# Patient Record
Sex: MALE | Race: WHITE
[De-identification: names, ages, dates, MRNs, and addresses within clinical notes are randomized per-mention and may not be internally consistent; named-entity substitution may affect disease eponyms.]

---

## 2017-07-11 ENCOUNTER — HOSPITAL ENCOUNTER (EMERGENCY)
Dept: HOSPITAL 76 - ED | Age: 77
Discharge: HOME | End: 2017-07-11
Payer: MEDICARE

## 2017-07-11 VITALS — DIASTOLIC BLOOD PRESSURE: 69 MMHG | SYSTOLIC BLOOD PRESSURE: 135 MMHG

## 2017-07-11 DIAGNOSIS — J45.909: ICD-10-CM

## 2017-07-11 DIAGNOSIS — M19.90: ICD-10-CM

## 2017-07-11 DIAGNOSIS — Z86.718: ICD-10-CM

## 2017-07-11 DIAGNOSIS — M79.605: Primary | ICD-10-CM

## 2017-07-11 PROCEDURE — 99283 EMERGENCY DEPT VISIT LOW MDM: CPT

## 2017-07-11 NOTE — ED PHYSICIAN DOCUMENTATION
History of Present Illness





- Stated complaint


Stated Complaint: LT LE PAIN





- Chief complaint


Chief Complaint: Ext Problem





- History obtained from


History obtained from: Patient





- History of Present Illness


Timing: Enter  time (17:30), Today


Pain level max: 8 (initially)


Pain level now: 0 (at rest)


Radiates to: no radiation


Improved by: rest


Worsened by: weight-bearing





- Additonal information


Additional information: 





sudden onset left leg pain proximal to knee, onset while standing in kitchen 

but without specific inciting event. Pain has significantly improved PTA, and 

he is pain-free at rest, but when he tries to weight-bear LLE, he feels the leg 

is unstable and the pain returns





Review of Systems


Cardiac: denies: Chest pain / pressure, Pedal edema


Respiratory: denies: Dyspnea


Musculoskeletal: reports: Extremity pain, Pain with weight bearing.  denies: 

Back pain, Joint pain, Extremity swelling, Joint swelling


Neurologic: denies: Focal weakness, Numbness





PD PAST MEDICAL HISTORY





- Past Medical History


Cardiovascular: Deep vein thrombosis, Other


Respiratory: Asthma, Other


Neuro: None


Endocrine/Autoimmune: None


GI: None


: Other


HEENT: None


Psych: Claustrophobia


Musculoskeletal: Osteoarthritis


Derm: None





- Past Surgical History


Past Surgical History: Yes


General: Appendectomy, Colonoscopy


Ortho: Hip replacement


HEENT: Tonsil/Adenoidectomy





- Present Medications


Home Medications: 


 Ambulatory Orders











 Medication  Instructions  Recorded  Confirmed


 


Fluticasone 44 Mcg [Flovent] 2 puffs INH BID 09/27/13 01/20/16


 


Ipratropium/Albuterol Inhaler 1 puffs INH QID PRN 09/27/13 01/20/16





[Combivent Inhaler]   


 


Montelukast Sodium [Singulair] 10 mg PO DAILY 09/27/13 01/20/16














- Allergies


Allergies/Adverse Reactions: 


 Allergies











Allergy/AdvReac Type Severity Reaction Status Date / Time


 


No Known Drug Allergies Allergy   Verified 07/11/17 19:27














- Social History


Does the pt smoke?: No


Smoking Status: Never smoker


Does the pt drink ETOH?: Yes


Does the pt have substance abuse?: No





- Immunizations


Immunizations are current?: Yes





- POLST


Patient has POLST: No





PD ED PE NORMAL





- Vitals


Vital signs reviewed: Yes





- General


General: Alert and oriented X 3, No acute distress, Well developed/nourished





- Derm


Derm: Normal color, Warm and dry, No rash





- Extremities


Extremities: No tenderness to palpate, Normal ROM s pain, No edema, No calf 

tenderness / cord, Other (2+ left DP and PT pulses. Brisk capillary refill left 

toes with LTS intact, normal tactile temperature of the left foot. Left leg 

including calf, thigh, and knee, are without tenderness, erythema, or swelling. 

FROM left knee without discomfort or difficulty. )





- Neuro


Neuro: No motor deficit (5/5 strength left dorsi/plantar flexion and left knee 

extension/ flexion), No sensory deficit





Results





- Vitals


Vitals: 


 Vital Signs - 24 hr











  07/11/17





  19:23


 


Temperature 36.8 C


 


Heart Rate 65


 


Respiratory 14





Rate 


 


Blood Pressure 135/69 H


 


O2 Saturation 98








 Oxygen











O2 Source []                   Room air


 


O2 Source                      Room air

















- Rads (name of study)


  ** LLE venous doppler US


Radiology: Prelim report reviewed, See rad report





PD MEDICAL DECISION MAKING





- ED course


Complexity details: reviewed old records, reviewed results, re-evaluated patient

, considered differential, d/w patient


ED course: 





declined pain medication, both in ED and as rx





Departure





- Departure


Disposition: 01 Home, Self Care


Clinical Impression: 


 Pain of lower extremity


Condition: Good


Instructions:  ED Acute Pain [facility]


Follow-Up: 


ROLA CHARLTON MD [Primary Care Provider] - Within 3 Days


Discharge Date/Time: 07/11/17 22:10

## 2017-07-11 NOTE — ULTRASOUND PRELIMINARY REPORT
Accession: [id number]

Exam: US Duplex Ext Veins Left

 

IMPRESSION: No evidence for deep venous thrombosis.

 

[facility]

 

SITE ID: [id number]

## 2017-07-11 NOTE — ULTRASOUND REPORT
EXAM:

LEFT LOWER EXTREMITY VENOUS ULTRASOUND 

 

EXAM DATE: 7/11/2017 09:32 PM.

 

CLINICAL HISTORY: Left lower extremity pain, history of DVT (right lower extremity).

 

COMPARISON: None.

 

TECHNIQUE: Real-time sonographic vascular imaging was performed by the sonographer through the lower 
extremity utilizing both color-flow and Doppler spectral analysis. Multiple representative static brandon
ges were saved for review.

 

FINDINGS: 

Common Femoral Vein (CFV): Normal.

CFV-GSV Junction: Normal.

Profunda Femoral Vein (PFV): Normal.

Femoral Vein (FV) Prox: Normal.

Femoral Vein (FV) Mid: Normal.

Femoral Vein (FV) Dist: Normal.

Popliteal Vein: Normal.

Posterior Tibial Veins: Normal.

Peroneal Veins: Normal.

Contralateral Side CFV: Normal.

 

Other: None.

 

IMPRESSION: No evidence for deep venous thrombosis.

 

[facility]

Referring Provider Line: [phone]

 

SITE ID: [id number]

## 2017-11-24 ENCOUNTER — HOSPITAL ENCOUNTER (OUTPATIENT)
Dept: HOSPITAL 76 - LAB.WCP | Age: 77
Discharge: HOME | End: 2017-11-24
Attending: FAMILY MEDICINE
Payer: MEDICARE

## 2017-11-24 DIAGNOSIS — Z79.899: ICD-10-CM

## 2017-11-24 DIAGNOSIS — Z00.00: Primary | ICD-10-CM

## 2017-11-24 DIAGNOSIS — Z12.5: ICD-10-CM

## 2017-11-24 DIAGNOSIS — Z13.220: ICD-10-CM

## 2017-11-24 LAB
ALBUMIN/GLOB SERPL: 1.5 {RATIO} (ref 1–2.2)
ANION GAP SERPL CALCULATED.4IONS-SCNC: 6 MMOL/L (ref 6–13)
BASOPHILS NFR BLD AUTO: 0 10^3/UL (ref 0–0.1)
BASOPHILS NFR BLD AUTO: 0.7 %
BILIRUB BLD-MCNC: 0.8 MG/DL (ref 0.2–1)
BUN SERPL-MCNC: 26 MG/DL (ref 6–20)
CALCIUM UR-MCNC: 9.8 MG/DL (ref 8.5–10.3)
CHLORIDE SERPL-SCNC: 107 MMOL/L (ref 101–111)
CHOLEST SERPL-MCNC: 186 MG/DL
CO2 SERPL-SCNC: 25 MMOL/L (ref 21–32)
CREAT SERPLBLD-SCNC: 0.9 MG/DL (ref 0.6–1.2)
EOSINOPHIL # BLD AUTO: 0.1 10^3/UL (ref 0–0.7)
EOSINOPHIL NFR BLD AUTO: 2.1 %
ERYTHROCYTE [DISTWIDTH] IN BLOOD BY AUTOMATED COUNT: 14.2 % (ref 12–15)
GFRSERPLBLD MDRD-ARVRAT: 82 ML/MIN/{1.73_M2} (ref 89–?)
GLOBULIN SER-MCNC: 2.8 G/DL (ref 2.1–4.2)
GLUCOSE SERPL-MCNC: 101 MG/DL (ref 70–100)
HCT VFR BLD AUTO: 46.2 % (ref 42–52)
HDLC SERPL-MCNC: 53 MG/DL
HDLC SERPL: 3.5 {RATIO} (ref ?–5)
HGB UR QL STRIP: 15.4 G/DL (ref 14–18)
LDLC/HDLC SERPL: 2.2 {RATIO} (ref ?–3.6)
LYMPHOCYTES # SPEC AUTO: 2 10^3/UL (ref 1.5–3.5)
LYMPHOCYTES NFR BLD AUTO: 30.4 %
MCH RBC QN AUTO: 30.5 PG (ref 27–31)
MCHC RBC AUTO-ENTMCNC: 33.4 G/DL (ref 32–36)
MCV RBC AUTO: 91.3 FL (ref 80–94)
MONOCYTES # BLD AUTO: 0.6 10^3/UL (ref 0–1)
MONOCYTES NFR BLD AUTO: 8.7 %
NEUTROPHILS # BLD AUTO: 3.9 10^3/UL (ref 1.5–6.6)
NEUTROPHILS # SNV AUTO: 6.7 X10^3/UL (ref 4.8–10.8)
NEUTROPHILS NFR BLD AUTO: 58.1 %
NRBC # BLD AUTO: 0.1 /100WBC
PDW BLD AUTO: 9.4 FL (ref 7.4–11.4)
POTASSIUM SERPL-SCNC: 4.3 MMOL/L (ref 3.5–5)
PROT SPEC-MCNC: 7 G/DL (ref 6.7–8.2)
RBC MAR: 5.06 10^6/UL (ref 4.7–6.1)
SODIUM SERPLBLD-SCNC: 138 MMOL/L (ref 135–145)
TRIGL P FAST SERPL-MCNC: 90 MG/DL
VLDLC SERPL-SCNC: 18 MG/DL
WBC # BLD: 6.7 X10^3/UL

## 2017-11-24 PROCEDURE — 85025 COMPLETE CBC W/AUTO DIFF WBC: CPT

## 2017-11-24 PROCEDURE — 80061 LIPID PANEL: CPT

## 2017-11-24 PROCEDURE — 84153 ASSAY OF PSA TOTAL: CPT

## 2017-11-24 PROCEDURE — 36415 COLL VENOUS BLD VENIPUNCTURE: CPT

## 2017-11-24 PROCEDURE — 80053 COMPREHEN METABOLIC PANEL: CPT

## 2017-12-30 ENCOUNTER — HOSPITAL ENCOUNTER (EMERGENCY)
Dept: HOSPITAL 76 - ED | Age: 77
Discharge: HOME | End: 2017-12-30
Payer: MEDICARE

## 2017-12-30 VITALS — DIASTOLIC BLOOD PRESSURE: 73 MMHG | SYSTOLIC BLOOD PRESSURE: 147 MMHG

## 2017-12-30 DIAGNOSIS — Z86.718: ICD-10-CM

## 2017-12-30 DIAGNOSIS — R10.84: Primary | ICD-10-CM

## 2017-12-30 LAB
ALBUMIN DIAFP-MCNC: 4.4 G/DL (ref 3.2–5.5)
ALBUMIN/GLOB SERPL: 1.4 {RATIO} (ref 1–2.2)
ALP SERPL-CCNC: 66 IU/L (ref 42–121)
ALT SERPL W P-5'-P-CCNC: 27 IU/L (ref 10–60)
ANION GAP SERPL CALCULATED.4IONS-SCNC: 6 MMOL/L (ref 6–13)
AST SERPL W P-5'-P-CCNC: 28 IU/L (ref 10–42)
BASOPHILS NFR BLD AUTO: 0.1 10^3/UL (ref 0–0.1)
BASOPHILS NFR BLD AUTO: 0.7 %
BILIRUB BLD-MCNC: 0.7 MG/DL (ref 0.2–1)
BUN SERPL-MCNC: 28 MG/DL (ref 6–20)
CALCIUM UR-MCNC: 9.8 MG/DL (ref 8.5–10.3)
CHLORIDE SERPL-SCNC: 105 MMOL/L (ref 101–111)
CLARITY UR REFRACT.AUTO: CLEAR
CO2 SERPL-SCNC: 29 MMOL/L (ref 21–32)
CREAT SERPLBLD-SCNC: 1 MG/DL (ref 0.6–1.2)
EOSINOPHIL # BLD AUTO: 0 10^3/UL (ref 0–0.7)
EOSINOPHIL NFR BLD AUTO: 0.6 %
ERYTHROCYTE [DISTWIDTH] IN BLOOD BY AUTOMATED COUNT: 14.2 % (ref 12–15)
GFRSERPLBLD MDRD-ARVRAT: 72 ML/MIN/{1.73_M2} (ref 89–?)
GLOBULIN SER-MCNC: 3.2 G/DL (ref 2.1–4.2)
GLUCOSE SERPL-MCNC: 116 MG/DL (ref 70–100)
GLUCOSE UR QL STRIP.AUTO: NEGATIVE MG/DL
HGB UR QL STRIP: 17 G/DL (ref 14–18)
KETONES UR QL STRIP.AUTO: NEGATIVE MG/DL
LIPASE SERPL-CCNC: 20 U/L (ref 22–51)
LYMPHOCYTES # SPEC AUTO: 1.9 10^3/UL (ref 1.5–3.5)
LYMPHOCYTES NFR BLD AUTO: 22.6 %
MCH RBC QN AUTO: 30.7 PG (ref 27–31)
MCHC RBC AUTO-ENTMCNC: 34.2 G/DL (ref 32–36)
MCV RBC AUTO: 89.7 FL (ref 80–94)
MONOCYTES # BLD AUTO: 1 10^3/UL (ref 0–1)
MONOCYTES NFR BLD AUTO: 12.5 %
NEUTROPHILS # BLD AUTO: 5.3 10^3/UL (ref 1.5–6.6)
NEUTROPHILS # SNV AUTO: 8.3 X10^3/UL (ref 4.8–10.8)
NEUTROPHILS NFR BLD AUTO: 63.6 %
NITRITE UR QL STRIP.AUTO: NEGATIVE
PDW BLD AUTO: 8.5 FL (ref 7.4–11.4)
PH UR STRIP.AUTO: 6 PH (ref 5–7.5)
PLATELET # BLD: 188 10^3/UL (ref 130–450)
PROT SPEC-MCNC: 7.6 G/DL (ref 6.7–8.2)
PROT UR STRIP.AUTO-MCNC: NEGATIVE MG/DL
RBC # UR STRIP.AUTO: NEGATIVE /UL
RBC MAR: 5.52 10^6/UL (ref 4.7–6.1)
SODIUM SERPLBLD-SCNC: 140 MMOL/L (ref 135–145)
SP GR UR STRIP.AUTO: >=1.03 (ref 1–1.03)
UROBILINOGEN UR QL STRIP.AUTO: (no result) E.U./DL
UROBILINOGEN UR STRIP.AUTO-MCNC: NEGATIVE MG/DL

## 2017-12-30 PROCEDURE — 84484 ASSAY OF TROPONIN QUANT: CPT

## 2017-12-30 PROCEDURE — 99284 EMERGENCY DEPT VISIT MOD MDM: CPT

## 2017-12-30 PROCEDURE — 83690 ASSAY OF LIPASE: CPT

## 2017-12-30 PROCEDURE — 81001 URINALYSIS AUTO W/SCOPE: CPT

## 2017-12-30 PROCEDURE — 36415 COLL VENOUS BLD VENIPUNCTURE: CPT

## 2017-12-30 PROCEDURE — 81003 URINALYSIS AUTO W/O SCOPE: CPT

## 2017-12-30 PROCEDURE — 80053 COMPREHEN METABOLIC PANEL: CPT

## 2017-12-30 PROCEDURE — 85025 COMPLETE CBC W/AUTO DIFF WBC: CPT

## 2017-12-30 PROCEDURE — 99283 EMERGENCY DEPT VISIT LOW MDM: CPT

## 2017-12-30 PROCEDURE — 87086 URINE CULTURE/COLONY COUNT: CPT

## 2017-12-30 PROCEDURE — 74176 CT ABD & PELVIS W/O CONTRAST: CPT

## 2017-12-30 NOTE — CT REPORT
EXAM:

CT ABDOMEN AND PELVIS

 

EXAM DATE: 12/30/2017 03:42 PM.

 

CLINICAL HISTORY: Diffuse abdomen pain. Vomiting.

 

COMPARISONS: None.

 

TECHNIQUE: Routine helical CT imaging was performed through the abdomen and pelvis. IV contrast: None
. Enteric contrast: No. Reconstructions: Coronal and sagittal.

 

In accordance with CT protocol optimization, one or more of the following dose reduction techniques w
ere utilized for this exam: automated exposure control, adjustment of mA and/or KV based on patient s
ize, or use of iterative reconstructive technique.

 

FINDINGS: 

Lung Bases: Aortic and coronary artery calcification noted, otherwise unremarkable..

 

Liver: Normal. No masses.

 

Gallbladder/Bile Ducts: Unremarkable.

 

Spleen: Normal.

 

Pancreas: Normal.

 

Adrenal Glands: Normal.

 

Kidneys: Lower 3.8 cm cyst and nonobstructing 0.7 cm calcification on the left, otherwise unremarkabl
e unenhanced kidneys and ureters.

 

Peritoneal Cavity/Bowel: Mild diverticulosis. No free fluid, free air or adenopathy. No masses or acu
te inflammatory process. Nonvisualized appendix.

 

Pelvic Organs: Detail obscured by metal artifact. Prosthetic calcifications noted. Visualized bladder
 appears unremarkable.

 

Vasculature: No aneurysms or other significant abnormality.

 

Bones: Anterior bridging of the sacroiliac joints noted. Bilateral hip arthroplasties noted.

 

Other: None.

 

IMPRESSION: 

1. Mild diverticulosis without diverticulitis or other acute bowel abnormality. 

2. Left renal cyst and nonobstructing intrarenal calculus, otherwise unremarkable unenhanced kidneys 
and ureters.

 

[facility]

Referring Provider Line: [phone]

 

SITE ID: [id number]

## 2017-12-30 NOTE — ED PHYSICIAN DOCUMENTATION
History of Present Illness





- Stated complaint


Stated Complaint: STOMACH PAINS





- Chief complaint


Chief Complaint: Abd Pain





- Additonal information


Additional information: 





hx from pt


77 male


s/p appy and hernia repairs X many


2-3 days of diffuse abd pain


severe yesterday better but not gone this AM now severe again


no fever


+ NV


no diarrhea


no bloody BM


last BM this AM


always has urinary hesitancy after many hernia surgeries


no back or chest pain








Review of Systems


Constitutional: denies: Fever, Chills


Cardiac: denies: Chest pain / pressure


Respiratory: denies: Dyspnea


GI: reports: Abdominal Pain, Nausea, Vomiting.  denies: Diarrhea


: reports: Hesitancy


Musculoskeletal: denies: Back pain


Endocrine: denies: Easy bruising / bleeding


Immunocompromised: denies: Immunocompromised





PD PAST MEDICAL HISTORY





- Past Medical History


Cardiovascular: Deep vein thrombosis, Other


Respiratory: Asthma, Other


Neuro: None


Endocrine/Autoimmune: None


GI: None


: Other


HEENT: None


Psych: Claustrophobia


Musculoskeletal: Osteoarthritis


Derm: None





- Past Surgical History


Past Surgical History: Yes


General: Appendectomy, Colonoscopy


Ortho: Hip replacement


HEENT: Tonsil/Adenoidectomy





- Present Medications


Home Medications: 


 Ambulatory Orders











 Medication  Instructions  Recorded  Confirmed


 


Fluticasone 44 Mcg [Flovent] 2 puffs INH BID 09/27/13 01/20/16


 


Ipratropium/Albuterol Inhaler 1 puffs INH QID PRN 09/27/13 01/20/16





[Combivent Inhaler]   


 


Montelukast Sodium [Singulair] 10 mg PO DAILY 09/27/13 01/20/16














- Allergies


Allergies/Adverse Reactions: 


 Allergies











Allergy/AdvReac Type Severity Reaction Status Date / Time


 


No Known Drug Allergies Allergy   Verified 07/11/17 19:27














- Social History


Does the pt smoke?: No


Smoking Status: Never smoker


Does the pt drink ETOH?: Yes


Does the pt have substance abuse?: No





- Immunizations


Immunizations are current?: Yes





- POLST


Patient has POLST: No





PD ED PE NORMAL





- Vitals


Vital signs reviewed: Yes





- Neck


Neck: Supple, no meningeal sign





- Cardiac


Cardiac: RRR





- Respiratory


Respiratory: No respiratory distress, Clear bilaterally





- Abdomen


Abdomen: Other (+ BS, protuberant but baseline per pt, reducible ventral hernia

, diffuse TTP  but not peritoneal)





- Back


Back: No CVA TTP





- Derm


Derm: Normal color





- Neuro


Neuro: Alert and oriented X 3





Results





- Vitals


Vitals: 


 Vital Signs - 24 hr











  12/30/17 12/30/17 12/30/17





  12:47 14:01 15:23


 


Temperature 36.1 C L 36.5 C 


 


Heart Rate 69 68 66


 


Respiratory 18 15 15





Rate   


 


Blood Pressure 135/75 H 132/66 H 153/76 H


 


O2 Saturation 96 96 99














  12/30/17





  17:35


 


Temperature 36.4 C L


 


Heart Rate 63


 


Respiratory 15





Rate 


 


Blood Pressure 119/70


 


O2 Saturation 97








 Oxygen











O2 Source [With Activity]      Room air


 


O2 Source                      Room air

















- Labs


Labs: 


 Laboratory Tests











  12/30/17 12/30/17 12/30/17





  13:58 13:58 13:58


 


WBC  8.3  


 


RBC  5.52  


 


Hgb  17.0  


 


Hct  49.5  


 


MCV  89.7  


 


MCH  30.7  


 


MCHC  34.2  


 


RDW  14.2  


 


Plt Count  188  


 


MPV  8.5  


 


Neut #  5.3  


 


Lymph #  1.9  


 


Mono #  1.0  


 


Eos #  0.0  


 


Baso #  0.1  


 


Absolute Nucleated RBC  0.01  


 


Nucleated RBC %  0.1  


 


Sodium   140 


 


Potassium   3.8 


 


Chloride   105 


 


Carbon Dioxide   29 


 


Anion Gap   6.0 


 


BUN   28 H 


 


Creatinine   1.0 


 


Estimated GFR (MDRD)   72 L 


 


Glucose   116 H 


 


Calcium   9.8 


 


Total Bilirubin   0.7 


 


AST   28 


 


ALT   27 


 


Alkaline Phosphatase   66 


 


Troponin I    < 0.04


 


Total Protein   7.6 


 


Albumin   4.4 


 


Globulin   3.2 


 


Albumin/Globulin Ratio   1.4 


 


Lipase   20 L 


 


Urine Color   


 


Urine Clarity   


 


Urine pH   


 


Ur Specific Gravity   


 


Urine Protein   


 


Urine Glucose (UA)   


 


Urine Ketones   


 


Urine Occult Blood   


 


Urine Nitrite   


 


Urine Bilirubin   


 


Urine Urobilinogen   


 


Ur Leukocyte Esterase   


 


Ur Microscopic Review   


 


Urine Culture Comments   














  12/30/17





  15:45


 


WBC 


 


RBC 


 


Hgb 


 


Hct 


 


MCV 


 


MCH 


 


MCHC 


 


RDW 


 


Plt Count 


 


MPV 


 


Neut # 


 


Lymph # 


 


Mono # 


 


Eos # 


 


Baso # 


 


Absolute Nucleated RBC 


 


Nucleated RBC % 


 


Sodium 


 


Potassium 


 


Chloride 


 


Carbon Dioxide 


 


Anion Gap 


 


BUN 


 


Creatinine 


 


Estimated GFR (MDRD) 


 


Glucose 


 


Calcium 


 


Total Bilirubin 


 


AST 


 


ALT 


 


Alkaline Phosphatase 


 


Troponin I 


 


Total Protein 


 


Albumin 


 


Globulin 


 


Albumin/Globulin Ratio 


 


Lipase 


 


Urine Color  YELLOW


 


Urine Clarity  CLEAR


 


Urine pH  6.0


 


Ur Specific Gravity  >=1.030 H


 


Urine Protein  NEGATIVE


 


Urine Glucose (UA)  NEGATIVE


 


Urine Ketones  NEGATIVE


 


Urine Occult Blood  NEGATIVE


 


Urine Nitrite  NEGATIVE


 


Urine Bilirubin  NEGATIVE


 


Urine Urobilinogen  0.2 (NORMAL)


 


Ur Leukocyte Esterase  NEGATIVE


 


Ur Microscopic Review  NOT INDICATED


 


Urine Culture Comments  NOT INDICATED














- Rads (name of study)


  ** CT AP


Radiology: See rad report (mild diverticulosis no -itis, no perf, no SBO, no AAA

, L renal cyst, non obstructing renal calculi, nl ureters)





PD MEDICAL DECISION MAKING





- ED course


ED course: 





waxing and waning fairly diffuse and generalized in a 77 male with a reducible 

ventral hernia but otherwise non peritineal exam, nl labs and a neg CT (no perf 

SBO infection AAA urteral stones etc


no hx afib, hx DVT, but pain is mild to mod (rates it a 3-5 per NN) so not pain 

out of proportion and CT in not an angio but does not suggest mesenteric 

ischemia 


so unfortunately do not know what is causing the pain 


in a 78 y/o this is concerning


but given the reassuring work up i think it is reasonable to let him go home 

with close fup


unfortunately this is a holiday weekend so close fup is difficult to obtain


will have pt come back to see me if still having sx tomorrow





Departure





- Departure


Disposition: 01 Home, Self Care


Clinical Impression: 


Abdominal pain


Qualifiers:


 Abdominal location: generalized Qualified Code(s): R10.84 - Generalized 

abdominal pain





Condition: Good


Instructions:  ED Abdominal Pain Unkn Cause


Comments: 


The good news is that all your tests came back fine.


Your blood work including kidney liver and pancreas function was fine.


The urine did not show any infection.


The CT scan did not show an aneurysm, a bowel infection/blockage/perforation, 

internal bleeding, and although there were some stones in the kidney none were 

actively passing to the bladder to explain the pain





The bad news is that I am still not sure what is causing the pain


But given the extensive and reassuring workup, I do not think you need surgery 

or admission or antibiotics at this time


I think it is safe for you to go home for tonight


It is very possible that over time, new or changing symptoms may develop that 

lead to a diagnosis not presently apparent.


That is why close follow up in this type of situation is very important.


Unfortunately this is a holiday weekend so close follow up with your PMD is not 

possible.


So I would like you to please come back and see me tomorrow morning - unless 

you are feeling much better in which case please call me to let me know [phone]


I have prescribed some medication to ease stomach pain - I do not want to 

prescribe anything very strong because I do not want to mask worsening symptoms

## 2017-12-31 ENCOUNTER — HOSPITAL ENCOUNTER (OUTPATIENT)
Dept: HOSPITAL 76 - ED | Age: 77
Setting detail: OBSERVATION
LOS: 1 days | Discharge: HOME | End: 2018-01-01
Attending: SURGERY | Admitting: SURGERY
Payer: MEDICARE

## 2017-12-31 DIAGNOSIS — K80.20: ICD-10-CM

## 2017-12-31 DIAGNOSIS — K56.609: ICD-10-CM

## 2017-12-31 DIAGNOSIS — K57.30: ICD-10-CM

## 2017-12-31 DIAGNOSIS — J45.909: ICD-10-CM

## 2017-12-31 DIAGNOSIS — Z86.718: ICD-10-CM

## 2017-12-31 DIAGNOSIS — K76.0: ICD-10-CM

## 2017-12-31 DIAGNOSIS — R14.0: ICD-10-CM

## 2017-12-31 DIAGNOSIS — I71.4: ICD-10-CM

## 2017-12-31 DIAGNOSIS — R10.10: Primary | ICD-10-CM

## 2017-12-31 DIAGNOSIS — K59.00: ICD-10-CM

## 2017-12-31 LAB
ALBUMIN DIAFP-MCNC: 4.3 G/DL (ref 3.2–5.5)
ALBUMIN/GLOB SERPL: 1.3 {RATIO} (ref 1–2.2)
ALP SERPL-CCNC: 64 IU/L (ref 42–121)
ALT SERPL W P-5'-P-CCNC: 27 IU/L (ref 10–60)
ANION GAP SERPL CALCULATED.4IONS-SCNC: 8 MMOL/L (ref 6–13)
AST SERPL W P-5'-P-CCNC: 30 IU/L (ref 10–42)
BASOPHILS NFR BLD AUTO: 0 10^3/UL (ref 0–0.1)
BASOPHILS NFR BLD AUTO: 0.5 %
BILIRUB BLD-MCNC: 0.8 MG/DL (ref 0.2–1)
BUN SERPL-MCNC: 29 MG/DL (ref 6–20)
CALCIUM UR-MCNC: 9.6 MG/DL (ref 8.5–10.3)
CHLORIDE SERPL-SCNC: 101 MMOL/L (ref 101–111)
CO2 SERPL-SCNC: 27 MMOL/L (ref 21–32)
CREAT SERPLBLD-SCNC: 0.9 MG/DL (ref 0.6–1.2)
EOSINOPHIL # BLD AUTO: 0.1 10^3/UL (ref 0–0.7)
EOSINOPHIL NFR BLD AUTO: 1 %
ERYTHROCYTE [DISTWIDTH] IN BLOOD BY AUTOMATED COUNT: 13.9 % (ref 12–15)
GFRSERPLBLD MDRD-ARVRAT: 82 ML/MIN/{1.73_M2} (ref 89–?)
GLOBULIN SER-MCNC: 3.4 G/DL (ref 2.1–4.2)
GLUCOSE SERPL-MCNC: 107 MG/DL (ref 70–100)
HGB UR QL STRIP: 16.2 G/DL (ref 14–18)
LIPASE SERPL-CCNC: 17 U/L (ref 22–51)
LYMPHOCYTES # SPEC AUTO: 2 10^3/UL (ref 1.5–3.5)
LYMPHOCYTES NFR BLD AUTO: 24.3 %
MCH RBC QN AUTO: 30.2 PG (ref 27–31)
MCHC RBC AUTO-ENTMCNC: 33.5 G/DL (ref 32–36)
MCV RBC AUTO: 90.1 FL (ref 80–94)
MONOCYTES # BLD AUTO: 0.7 10^3/UL (ref 0–1)
MONOCYTES NFR BLD AUTO: 8.3 %
NEUTROPHILS # BLD AUTO: 5.5 10^3/UL (ref 1.5–6.6)
NEUTROPHILS # SNV AUTO: 8.3 X10^3/UL (ref 4.8–10.8)
NEUTROPHILS NFR BLD AUTO: 65.9 %
PDW BLD AUTO: 8.8 FL (ref 7.4–11.4)
PLATELET # BLD: 195 10^3/UL (ref 130–450)
PROT SPEC-MCNC: 7.7 G/DL (ref 6.7–8.2)
RBC MAR: 5.35 10^6/UL (ref 4.7–6.1)
SODIUM SERPLBLD-SCNC: 136 MMOL/L (ref 135–145)

## 2017-12-31 PROCEDURE — 96361 HYDRATE IV INFUSION ADD-ON: CPT

## 2017-12-31 PROCEDURE — 80053 COMPREHEN METABOLIC PANEL: CPT

## 2017-12-31 PROCEDURE — 74174 CTA ABD&PLVS W/CONTRAST: CPT

## 2017-12-31 PROCEDURE — 85025 COMPLETE CBC W/AUTO DIFF WBC: CPT

## 2017-12-31 PROCEDURE — 99283 EMERGENCY DEPT VISIT LOW MDM: CPT

## 2017-12-31 PROCEDURE — 74022 RADEX COMPL AQT ABD SERIES: CPT

## 2017-12-31 PROCEDURE — 96372 THER/PROPH/DIAG INJ SC/IM: CPT

## 2017-12-31 PROCEDURE — 96374 THER/PROPH/DIAG INJ IV PUSH: CPT

## 2017-12-31 PROCEDURE — 99284 EMERGENCY DEPT VISIT MOD MDM: CPT

## 2017-12-31 PROCEDURE — 76705 ECHO EXAM OF ABDOMEN: CPT

## 2017-12-31 PROCEDURE — 36415 COLL VENOUS BLD VENIPUNCTURE: CPT

## 2017-12-31 PROCEDURE — 96375 TX/PRO/DX INJ NEW DRUG ADDON: CPT

## 2017-12-31 PROCEDURE — 83690 ASSAY OF LIPASE: CPT

## 2017-12-31 RX ADMIN — SODIUM CHLORIDE, PRESERVATIVE FREE SCH: 5 INJECTION INTRAVENOUS at 22:56

## 2017-12-31 RX ADMIN — SODIUM CHLORIDE SCH MLS/HR: 9 INJECTION, SOLUTION INTRAVENOUS at 20:43

## 2017-12-31 RX ADMIN — HEPARIN SODIUM SCH UNIT: 5000 INJECTION, SOLUTION INTRAVENOUS; SUBCUTANEOUS at 21:15

## 2017-12-31 NOTE — ULTRASOUND PRELIMINARY REPORT
Accession: [id number]

Exam: US ABDOMEN LIMITED

 

IMPRESSION: 

1. Cholelithiasis. No inflammation to suggest acute cholecystitis. 

2. Heterogeneous, probably fatty liver. No mass or intervertebral duct dilation. 

3. Normal common bile duct and pancreas.

 

Comment: Study limited due to body habitus.

 

[facility]

 

SITE ID: [id number]

## 2017-12-31 NOTE — ULTRASOUND REPORT
EXAM:

ABDOMEN ULTRASOUND LIMITED, RUQ

 

EXAM DATE: 12/31/2017 05:02 PM.

 

CLINICAL HISTORY: Abdominal pain after eating.

 

COMPARISON: 12/30/2017 CT abdomen and pelvis.

 

TECHNIQUE: Real-time scanning was performed with static images obtained. 

 

FINDINGS: 

Liver: Not well evaluated due to body habitus. No mass or intrahepatic bile duct dilation. Heterogene
ous liver. No mass or intrahepatic bile duct dilation. 15.7 cm. Main portal vein flow: Hepatopetal.

 

Gallbladder: 1.4 cm stone in the gallbladder neck. Mild gallbladder wall thickening. No pericholecyst
ic fluid. Per report, patient and a negative sonographic Kilpatrick's sign. 

 

Biliary System: CBD measures 7 mm. No intrahepatic or extrahepatic ductal dilatation. 

 

Pancreas: Echogenic. No mass, pancreatic ductal dilation or atrophy or abnormal calcifications.

 

Right kidney: 10.8 cm. No hydronephrosis. Incidental 1.3 x 1 cm simple cortical cyst.

 

Other: Study limited due to body habitus. 

 

IMPRESSION: 

1. Cholelithiasis. No inflammation to suggest acute cholecystitis. 

2. Heterogeneous, probably fatty liver. No mass or intrahepatic bile duct dilation. 

3. Normal common bile duct and pancreas.

 

Comment: Study limited due to body habitus.

 

[facility]

Referring Provider Line: [phone]

 

SITE ID: [id number]

## 2017-12-31 NOTE — ED PHYSICIAN DOCUMENTATION
History of Present Illness





- Stated complaint


Stated Complaint: ABD PX





- Chief complaint


Chief Complaint: Abd Pain





- Additonal information


Additional information: 





hx from pt


77 male seen by yesterday for generalized abd pain


pshx hernia repair X many and appy


exam notable for diffuse TTP s peritoneal sx and a reducible ventral hernia


labs and non con CT neg and pt with only mild to mod pain


dc with precautions and asked to return if worse


was better this AM, filled his bentyl, doing well until he ate (oatmeal and 

banana) and then pain returned - more severe than yesterday and localized to 

upper abd


no fever chills


no NVD


no bloody BM


no urinary sx





Review of Systems


Constitutional: denies: Fever, Chills


Cardiac: denies: Chest pain / pressure


Respiratory: denies: Dyspnea


GI: reports: Abdominal Pain.  denies: Nausea, Vomiting, Diarrhea


: denies: Dysuria


Musculoskeletal: denies: Back pain


Endocrine: denies: Easy bruising / bleeding


Immunocompromised: denies: Immunocompromised





PD PAST MEDICAL HISTORY





- Past Medical History


Cardiovascular: Deep vein thrombosis, Other


Respiratory: Asthma, Other


Neuro: None


Endocrine/Autoimmune: None


GI: None


: Other


HEENT: None


Psych: Claustrophobia


Musculoskeletal: Osteoarthritis


Derm: None





- Past Surgical History


Past Surgical History: Yes


General: Appendectomy, Colonoscopy


Ortho: Hip replacement


HEENT: Tonsil/Adenoidectomy





- Present Medications


Home Medications: 


 Ambulatory Orders











 Medication  Instructions  Recorded  Confirmed


 


Fluticasone 44 Mcg [Flovent] 2 puffs INH BID 09/27/13 12/31/17


 


Ipratropium/Albuterol Inhaler 1 puffs INH QID PRN 09/27/13 12/31/17





[Combivent Inhaler]   


 


Montelukast Sodium [Singulair] 10 mg PO DAILY 09/27/13 12/31/17


 


Dicyclomine [Bentyl] 10 mg PO Q8H PRN #20 capsule 12/30/17 12/31/17














- Allergies


Allergies/Adverse Reactions: 


 Allergies











Allergy/AdvReac Type Severity Reaction Status Date / Time


 


No Known Drug Allergies Allergy   Verified 12/31/17 15:39














- Social History


Does the pt smoke?: No


Smoking Status: Never smoker


Does the pt drink ETOH?: Yes


Does the pt have substance abuse?: No





- Immunizations


Immunizations are current?: Yes





- POLST


Patient has POLST: No





PD ED PE NORMAL





- Vitals


Vital signs reviewed: Yes





- General


General: Alert and oriented X 3





- HEENT


HEENT: PERRL





- Neck


Neck: Supple, no meningeal sign





- Cardiac


Cardiac: RRR





- Respiratory


Respiratory: No respiratory distress, Clear bilaterally





- Abdomen


Abdomen: Soft, Other (protuberant, + BS, soft, sig TTP upper abd and today more 

focal ruq pain with + murphys, no lower abd TTP)





- Derm


Derm: Normal color





- Extremities


Extremities: No deformity





- Neuro


Neuro: Alert and oriented X 3





Results





- Vitals


Vitals: 


 Vital Signs - 24 hr











  12/31/17 12/31/17





  15:36 16:01


 


Temperature 36.0 C L 36.5 C


 


Heart Rate 71 71


 


Respiratory 16 16





Rate  


 


Blood Pressure 179/65 H 143/88 H


 


O2 Saturation 97 97








 Oxygen











O2 Source []                   Room air


 


O2 Source                      Room air

















- Labs


Labs: 


 Laboratory Tests











  12/31/17 12/31/17





  16:20 16:20


 


WBC  8.3 


 


RBC  5.35 


 


Hgb  16.2 


 


Hct  48.2 


 


MCV  90.1 


 


MCH  30.2 


 


MCHC  33.5 


 


RDW  13.9 


 


Plt Count  195 


 


MPV  8.8 


 


Neut #  5.5 


 


Lymph #  2.0 


 


Mono #  0.7 


 


Eos #  0.1 


 


Baso #  0.0 


 


Absolute Nucleated RBC  0.00 


 


Nucleated RBC %  0.0 


 


Sodium   136


 


Potassium   3.7


 


Chloride   101


 


Carbon Dioxide   27


 


Anion Gap   8.0


 


BUN   29 H


 


Creatinine   0.9


 


Estimated GFR (MDRD)   82 L


 


Glucose   107 H


 


Calcium   9.6


 


Total Bilirubin   0.8


 


AST   30


 


ALT   27


 


Alkaline Phosphatase   64


 


Total Protein   7.7


 


Albumin   4.3


 


Globulin   3.4


 


Albumin/Globulin Ratio   1.3


 


Lipase   17 L














- Rads (name of study)


  ** CTA AP


Radiology: Other (dilated small bowel transition distal small bowel c/w SBO 

likely 2/2 adhesions, small < 2 cm saccular AAA, mod stenosis inferior 

mesenteric artery, diverticulosis)





  ** abd sono


Radiology: See rad report (1.4 cm stone in neck of gallbladder, mild wall 

thickening, no pericholecystic fluid, per tech neg sono murphys (had MSO4 and 

def had RUQ TTP on my exam), fatty liver, nl ducts)





PD MEDICAL DECISION MAKING





- ED course


ED course: 





yesterday with milder pain and a non diagnostic work up


was advised that sx may change over time and lead to dx not initially apparent, 

dc'ed with precautions and to come back and see me for a recheck


returned as requested and todays work up shows both an SBO and a GB neck stone 

with some wall thickening but no fever nl LFTs nl WBC (and small AAA and 

diverticulosis and slight stenosis of inf mesenteric artery and a fatty liver - 

pt and wife advised of all findings and need for serial AAA sonos by PMD)


seen by surgeon Dr Flowers who admit to obs





Departure





- Departure


Disposition: ED Place in Observation


Clinical Impression: 


 SBO (small bowel obstruction)





Gallstone


Qualifiers:


 Cholecystitis presence: without cholecystitis Biliary obstruction: without 

biliary obstruction Qualified Code(s): K80.20 - Calculus of gallbladder without 

cholecystitis without obstruction





Condition: Good

## 2017-12-31 NOTE — CT REPORT
EXAM:

CT ANGIOGRAM ABDOMEN AND PELVIS WITH CONTRAST

 

EXAM DATE: 12/31/2017 05:13 PM.

 

CLINICAL HISTORY: Severe abdominal pain, worse with eating.

 

COMPARISONS: Abdomen and pelvis CT 12/30/2017 and chest CT 02/16/2014.

 

TECHNIQUE: Routine helical CT angiogram imaging was performed through the abdomen and pelvis in the a
rterial phase. IV contrast: 100 cc Isovue-300. Enteric contrast: No. Reconstructions: Coronal, sagitt
al, and 3D MIP reconstructions.

 

In accordance with CT protocol optimization, one or more of the following dose reduction techniques w
ere utilized for this exam: automated exposure control, adjustment of mA and/or KV based on patient s
ize, or use of iterative reconstructive technique.

 

FINDINGS: 

Vasculature: Included portions of the thoracic aorta and pulmonary artery are unremarkable. Moderate 
coronary atherosclerosis. There is a small saccular infrarenal aortic aneurysm measuring 16 x 9 mm ex
tending anteriorly. The bilateral common iliac, internal and external iliac arteries are normal in ca
liber with moderate atherosclerotic calcification noted. Mild atherosclerosis at the origin of the ce
liac artery. Mild atherosclerosis super mesenteric artery. Moderate stenosis at the origin of the inf
erior mesenteric artery. No significant renal artery stenosis. Accessory left hepatic artery arises f
rom the left gastric artery.

 

Lung Bases: 6 mm pulmonary nodule right lower lobe anterior segment, stable compared to the prior exa
mination. Underlying centrilobular emphysema with areas of diskoid atelectasis. Stable left lower lob
e pulmonary nodule measuring 5 mm.

 

Abdominal Solid Organs: The liver, spleen, adrenal glands are unremarkable. Mild pancreatic atrophy. 
Gallbladder unremarkable. No hydronephrosis with left renal cyst measuring 3.9 cm. Nonobstructing nep
hrolithiasis on the left measuring up to 8 mm.

 

Peritoneal Cavity: Moderately dilated loops of small bowel with transition in the right lower quadran
t and decompressed distal small bowel consistent with a small bowel obstruction. Underlying colonic d
iverticulosis without focal inflammation. 

 

Pelvic Organs: The bladder is unremarkable. Surgical clips in the right lower quadrant in the right i
nguinal region. Evaluation of the pelvis is somewhat limited due to beam hardening artifact from the 
hip replacements.

 

Bones: Status post bilateral total hip replacements.

 

Other: None.

 

IMPRESSION: 

1. Dilated small bowel with transition at the mid to distal small bowel consistent with a small bowel
 obstruction. Underlying etiology is likely adhesions. 

2. Small saccular abdominal aortic aneurysm extending anteriorly measuring 16 x 9 mm. 

3. 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 

4. Mild diverticulosis without trudy diverticulitis.

 

[facility]

Referring Provider Line: [phone]

 

SITE ID: [id number]

## 2018-01-01 VITALS — SYSTOLIC BLOOD PRESSURE: 139 MMHG | DIASTOLIC BLOOD PRESSURE: 58 MMHG

## 2018-01-01 RX ADMIN — SODIUM CHLORIDE, PRESERVATIVE FREE SCH: 5 INJECTION INTRAVENOUS at 06:49

## 2018-01-01 RX ADMIN — SODIUM CHLORIDE, PRESERVATIVE FREE SCH: 5 INJECTION INTRAVENOUS at 13:38

## 2018-01-01 RX ADMIN — HEPARIN SODIUM SCH: 5000 INJECTION, SOLUTION INTRAVENOUS; SUBCUTANEOUS at 13:38

## 2018-01-01 RX ADMIN — HEPARIN SODIUM SCH UNIT: 5000 INJECTION, SOLUTION INTRAVENOUS; SUBCUTANEOUS at 06:36

## 2018-01-01 RX ADMIN — SODIUM CHLORIDE SCH MLS/HR: 9 INJECTION, SOLUTION INTRAVENOUS at 06:40

## 2018-01-01 NOTE — XRAY PRELIMINARY REPORT
Accession: [id number]

Exam: XR ABDOMEN ACUTE

 

IMPRESSION: 

1. Nonspecific gas pattern with some small bowel and colonic air-fluid levels. Borderline caliber sma
ll bowel loops. 

2. Bibasilar atelectasis or less likely infiltrate. 

3. Left renal stone.

 

[facility]

 

SITE ID: [id number]

## 2018-01-01 NOTE — DISCHARGE PLAN
Discharge Plan


Disposition: 01 Home, Self Care


Condition: Stable


Diet: Regular


Activity Restrictions: No Restrictions


Shower Restrictions: No


Driving Restrictions: No


Weight Bearing: Full Weight


Additional Instructions or Follow Up instructions: 


Colace 100mg   tablets twice a day





Return to ER if severe abdominal pain returns


No Smoking: If you smoke, Please STOP!  Call [phone] for help.


Follow-up with: 


Thai Escamilla MD [Primary Care Provider] - 2 Weeks

## 2018-01-01 NOTE — PROVIDER PROGRESS NOTE
Subjective





- General


Admit Date: 12/31/17





- Review of Systems


Gastrointestinal: positive: Other (abdominal pain improved after having a bowel 

movement but having some mild bloating on the left side.)





Objective





- Patient Data


Weight: 





 Weight











 12/30/17 12/31/17 01/01/18





 23:59 23:59 23:59


 


Weight (kg)  90.718 kg 











Intake & Output: 





 Intake and Output Totals x24h











 12/30/17 12/31/17 01/01/18





 23:59 23:59 23:59


 


Intake Total  1000 3000


 


Balance  1000 3000














- Lab Results


Lab Results: 


 12/31/17 16:20





 12/31/17 16:20





- Physical Exam


Abdomen: positive: Non-tender





Impression/Plan





- Problem List


Problem List: 





abdominal pain possibly related to partial small bowel obstruction seen on ct 

scan vs constipation seen on Acute abdominal series.  He was given several 

enemas along with lactulose early today having several bowel movements.  He 

abdominal pain has pretty much resolved with only c/o some bloating in the llq.

  Will d/c home today. Last colonoscopy was 4 years ago with next one due in 1 

years. 


Recommend fiber, stool softners.  Return if pain returns.


F/u with PCP in 2 weeks.

## 2018-01-01 NOTE — XRAY REPORT
EXAM:

ABDOMINAL SERIES AND PA CHEST

 

EXAM DATE: 1/1/2018 05:44 AM.

 

CLINICAL HISTORY: Abdominal pain.

 

COMPARISON: Chest, 02/15/2014.

 

TECHNIQUE: 2 views abdomen and 1 view chest.

 

FINDINGS:

CHEST:

Lungs/Pleura: Elevated right hemidiaphragm. Mild bibasilar atelectasis or infiltrate. No pleural effu
bismark. No pneumothorax.

 

Mediastinum: Within exam limitations, cardiomediastinal contour is normal.

 

ABDOMEN:

Bowel Gas Pattern: Nonspecific. Borderline caliber small bowel loops with air-fluid levels. Moderate 
stool in the colon with some colonic air-fluid levels as well.

 

Free Air: None.

 

Other: Excreted contrast in the urinary bladder. Bilateral hip prostheses. Left renal stone.

 

IMPRESSION: 

1. Nonspecific gas pattern with some small bowel and colonic air-fluid levels. Borderline caliber sma
ll bowel loops. 

2. Bibasilar atelectasis or less likely infiltrate. 

3. Left renal stone.

 

[facility]

Referring Provider Line: [phone]

 

SITE ID: [id number]

## 2018-01-15 NOTE — HISTORY & PHYSICAL EXAMINATION
DATE OF SERVICE:

Physician: Phuc Flowers MD

 

REASON FOR ADMISSION:  Abdominal pain.

 

DATE OF ADMISSION:  [ADMIT DATE] 12/31/2017

 

HISTORY OF PRESENT ILLNESS:  The patient is a 77-year-old male who presents with abdominal 

pain.  Because of the pain he came to the emergency room to be evaluated.  He denied any nausea

or vomiting.  He has been having bowel movement up until yesterday.  The patient's pain has 

become more severe and therefore came to the emergency room.

 

PAST MEDICAL HISTORY

1.  Asthma.

2.  Deep vein thrombosis.

 

PAST SURGICAL HISTORY

1.  Appendectomy.

2.  Multiple abdominal surgeries for ventral hernia.

3.  Tonsillectomy, adenoidectomy.

 

MEDICATIONS

1.  Flovent.

2.  Combivent.

3.  Singulair.

4.  Bentyl.

 

ALLERGIES TO MEDICATIONS:  NONE.

 

HABITS:  The patient socially uses alcohol.  Denies any current smoking or drug use.

 

FAMILY HISTORY:  Noncontributory.

 

SOCIAL HISTORY:  The patient is .

 

REVIEW OF SYSTEMS

PULMONARY:  No shortness of breath.

GASTROINTESTINAL:  No abdominal pain.  A 12-point review of systems was obtained with pertinent

positives discussed and all others being negative.

 

PHYSICAL EXAMINATION:

VITAL SIGNS:  Temperature is 36.5, heart rate 71, blood pressure 143/88.

GENERAL:  The patient is in bed, in minimal discomfort.  He denies any current nausea or 

vomiting.  He is cooperative.

EYES:  Nonicteric.

NECK:  No lymphadenopathy.

HEART:  Regular.

LUNGS:  Clear.

BACK:  Nontender.

ABDOMEN:  Soft, nontender with positive bowel sounds.  No obvious hernias.

EXTREMITIES:  No edema or cyanosis.

NEUROLOGIC:  The patient appears to be neurologically intact without any deficits.

PSYCHOLOGICAL:  The patient is coherent, cooperative, appears to answer questions fully.

 

A CT angiogram of the abdomen does show moderate stenosis of the inferior mesenteric artery 

with no other abnormalities being present in the vasculature, other than a small aneurysm 1.9 

cm.  There is dilation of the small bowel with transition into the mid to distal small bowel 

consistent with small bowel obstruction.  Creatinine of 0.9. White blood cell count of 8.3, 

hemoglobin of 16.2.

 

ASSESSMENT AND PLAN

1.  Abdominal pain with CT scan findings of his abdomen and pelvis consistent with partial 

small-bowel obstruction.  I would recommend the patient be admitted to the hospital for 

observation to see if it will resolve with medical therapy or if he would need surgical 

intervention.

2.  Asthma, controlled with inhalers.

 

PLAN

1.  The patient will be admitted to observation.

2.  Keep n.p.o.

3.  IV fluids.

4.  IV antibiotics.

5.  Acute abdominal series in the a.m.

6.  Followup exam of his abdomen.

 

 

DD: 01/15/2018 01:20

TD: 01/15/2018 10:39

Job #: [id number]